# Patient Record
Sex: FEMALE | Race: WHITE | NOT HISPANIC OR LATINO | URBAN - METROPOLITAN AREA
[De-identification: names, ages, dates, MRNs, and addresses within clinical notes are randomized per-mention and may not be internally consistent; named-entity substitution may affect disease eponyms.]

---

## 2021-07-30 ENCOUNTER — OFFICE VISIT (OUTPATIENT)
Dept: URBAN - METROPOLITAN AREA CLINIC 72 | Facility: CLINIC | Age: 61
End: 2021-07-30
Payer: COMMERCIAL

## 2021-07-30 ENCOUNTER — WEB ENCOUNTER (OUTPATIENT)
Dept: URBAN - METROPOLITAN AREA CLINIC 72 | Facility: CLINIC | Age: 61
End: 2021-07-30

## 2021-07-30 VITALS — TEMPERATURE: 97.4 F | WEIGHT: 204 LBS | BODY MASS INDEX: 37.54 KG/M2 | HEIGHT: 62 IN

## 2021-07-30 DIAGNOSIS — E66.9 OBESITY (BMI 30.0-34.9): ICD-10-CM

## 2021-07-30 DIAGNOSIS — K58.2 IRRITABLE BOWEL SYNDROME WITH BOTH CONSTIPATION AND DIARRHEA: ICD-10-CM

## 2021-07-30 DIAGNOSIS — K21.9 GASTROESOPHAGEAL REFLUX DISEASE, UNSPECIFIED WHETHER ESOPHAGITIS PRESENT: ICD-10-CM

## 2021-07-30 PROCEDURE — 99204 OFFICE O/P NEW MOD 45 MIN: CPT | Performed by: INTERNAL MEDICINE

## 2021-07-30 RX ORDER — INSULIN GLARGINE 100 [IU]/ML
AS DIRECTED INJECTION, SOLUTION SUBCUTANEOUS
Status: ACTIVE | COMMUNITY

## 2021-07-30 RX ORDER — UBIDECARENONE 200 MG
AS DIRECTED CAPSULE ORAL
Status: ACTIVE | COMMUNITY

## 2021-07-30 RX ORDER — VIT C/ZINC GLUCONAT/ELDERBERRY 60 MG-5 MG
AS DIRECTED LOZENGE ORAL
Status: ACTIVE | COMMUNITY

## 2021-07-30 NOTE — HPI-TODAY'S VISIT:
Mrs. Burt is a pleasant 61-year-old female who presented for new patient for consultation for irritable bowel syndrome.  She was referred by Dr. Bren Pérez.  Her past medical history is notable for hyperlipidemia and type 2 diabetes.   she has been followed by another local gastroenterologist.  She did not feel like she has had much improvement of her IBS.  Her typical IBS pattern centers around diarrhea and constipation.  The most bothersome symptom for her is pain prior to defecation.  She notes that she will eat crampy lower abdominal pain feel the urge to go to the bathroom and then have to defecate 3-4 times starting on his normal stool and progressing to diarrhea.  She is intolerant of medications in the past for this.  She is never been on any type of antidepressant.  She also notes that she has GERD, takes omeprazole which does alleviate her symptoms.    She does not follow any specific diet, she endorses minimal exercise. She reports that she has had EGDs and colonoscopy in the recent past and were unremarkable.

## 2021-09-17 ENCOUNTER — OFFICE VISIT (OUTPATIENT)
Dept: URBAN - METROPOLITAN AREA CLINIC 72 | Facility: CLINIC | Age: 61
End: 2021-09-17

## 2021-10-08 ENCOUNTER — OFFICE VISIT (OUTPATIENT)
Dept: URBAN - METROPOLITAN AREA CLINIC 72 | Facility: CLINIC | Age: 61
End: 2021-10-08

## 2021-12-02 ENCOUNTER — OFFICE VISIT (OUTPATIENT)
Dept: URBAN - METROPOLITAN AREA CLINIC 72 | Facility: CLINIC | Age: 61
End: 2021-12-02
Payer: COMMERCIAL

## 2021-12-02 ENCOUNTER — WEB ENCOUNTER (OUTPATIENT)
Dept: URBAN - METROPOLITAN AREA CLINIC 72 | Facility: CLINIC | Age: 61
End: 2021-12-02

## 2021-12-02 VITALS
TEMPERATURE: 98.2 F | RESPIRATION RATE: 18 BRPM | SYSTOLIC BLOOD PRESSURE: 128 MMHG | HEART RATE: 69 BPM | HEIGHT: 62 IN | DIASTOLIC BLOOD PRESSURE: 83 MMHG | WEIGHT: 194 LBS

## 2021-12-02 DIAGNOSIS — K58.2 IRRITABLE BOWEL SYNDROME WITH BOTH CONSTIPATION AND DIARRHEA: ICD-10-CM

## 2021-12-02 DIAGNOSIS — K21.9 GASTROESOPHAGEAL REFLUX DISEASE, UNSPECIFIED WHETHER ESOPHAGITIS PRESENT: ICD-10-CM

## 2021-12-02 DIAGNOSIS — E66.9 OBESITY (BMI 30.0-34.9): ICD-10-CM

## 2021-12-02 PROBLEM — 443371000124107: Status: ACTIVE | Noted: 2021-07-30

## 2021-12-02 PROBLEM — 10743008: Status: ACTIVE | Noted: 2021-07-30

## 2021-12-02 PROCEDURE — 99214 OFFICE O/P EST MOD 30 MIN: CPT | Performed by: INTERNAL MEDICINE

## 2021-12-02 RX ORDER — EVOLOCUMAB 140 MG/ML
AS DIRECTED INJECTION, SOLUTION SUBCUTANEOUS EVERY 2 WEEKS
Status: ACTIVE | COMMUNITY

## 2021-12-02 RX ORDER — VIT C/ZINC GLUCONAT/ELDERBERRY 60 MG-5 MG
AS DIRECTED LOZENGE ORAL
Status: ACTIVE | COMMUNITY

## 2021-12-02 RX ORDER — INSULIN GLARGINE 100 [IU]/ML
AS DIRECTED INJECTION, SOLUTION SUBCUTANEOUS
Status: ACTIVE | COMMUNITY

## 2021-12-02 RX ORDER — UBIDECARENONE 200 MG
AS DIRECTED CAPSULE ORAL
Status: ACTIVE | COMMUNITY

## 2021-12-02 NOTE — HPI-TODAY'S VISIT:
Mrs. Burt returns for follow-up.  She is a 61-year-old female last seen in our office on 7/30/2021.  She has a history of hyperlipidemia and diabetes as well as IBS with both constipation and diarrhea.  Her typical symptoms include crampy lower abdominal discomfort with urgency.  She has been intolerant of medications for treatment for this.  Had an EGD and colonoscopy in the recent past which she described as unremarkable.  She also notes she has GERD and takes omeprazole.  We advised extensive lifestyle modification including weight loss and following the low FODMAPs diet. She feels like her symptoms are very slowly getting better, she is trying to get back on her healthy diet which does exacerbate some of her symptoms overall.

## 2023-05-31 ENCOUNTER — OFFICE VISIT (OUTPATIENT)
Dept: URBAN - METROPOLITAN AREA CLINIC 72 | Facility: CLINIC | Age: 63
End: 2023-05-31
Payer: COMMERCIAL

## 2023-05-31 VITALS
BODY MASS INDEX: 37.36 KG/M2 | WEIGHT: 203 LBS | DIASTOLIC BLOOD PRESSURE: 76 MMHG | TEMPERATURE: 97.6 F | HEIGHT: 62 IN | SYSTOLIC BLOOD PRESSURE: 127 MMHG | HEART RATE: 78 BPM

## 2023-05-31 DIAGNOSIS — K58.2 IRRITABLE BOWEL SYNDROME WITH BOTH CONSTIPATION AND DIARRHEA: ICD-10-CM

## 2023-05-31 DIAGNOSIS — E66.9 OBESITY (BMI 30.0-34.9): ICD-10-CM

## 2023-05-31 DIAGNOSIS — K21.9 GASTROESOPHAGEAL REFLUX DISEASE, UNSPECIFIED WHETHER ESOPHAGITIS PRESENT: ICD-10-CM

## 2023-05-31 PROCEDURE — 99214 OFFICE O/P EST MOD 30 MIN: CPT | Performed by: INTERNAL MEDICINE

## 2023-05-31 RX ORDER — INSULIN GLARGINE 100 [IU]/ML
AS DIRECTED INJECTION, SOLUTION SUBCUTANEOUS
Status: ACTIVE | COMMUNITY

## 2023-05-31 RX ORDER — TIRZEPATIDE 2.5 MG/.5ML
INJECT THE CONTENTS OF ONE PEN UNDER THE SKIN WEEKLY ON THE SAME DAY EACH WEEK INJECTION, SOLUTION SUBCUTANEOUS
Qty: 2 UNSPECIFIED | Refills: 0 | Status: ACTIVE | COMMUNITY

## 2023-05-31 RX ORDER — OMEPRAZOLE 40 MG/1
1 CAPSULE 30 MINUTES BEFORE MORNING MEAL CAPSULE, DELAYED RELEASE ORAL ONCE A DAY
Qty: 30 | OUTPATIENT
Start: 2023-05-31

## 2023-05-31 RX ORDER — VIT C/ZINC GLUCONAT/ELDERBERRY 60 MG-5 MG
AS DIRECTED LOZENGE ORAL
Status: ACTIVE | COMMUNITY

## 2023-05-31 RX ORDER — UBIDECARENONE 200 MG
AS DIRECTED CAPSULE ORAL
Status: ACTIVE | COMMUNITY

## 2023-05-31 RX ORDER — EVOLOCUMAB 140 MG/ML
AS DIRECTED INJECTION, SOLUTION SUBCUTANEOUS EVERY 2 WEEKS
Status: ACTIVE | COMMUNITY

## 2023-05-31 NOTE — HPI-TODAY'S VISIT:
Mrs. Burt is a 63-year-old female last seen our office on 12/2/2021.  Recall she has a history of diabetes, hyperlipidemia and alternating IBS.  Typically cold symptoms including crampy lower abdominal discomfort and urgency.  Bidirectional endoscopy in 2019 unremarkable, with extensive biopsies performed showed reflux-like changes in the esophagus, random colon biopsies and terminal ileal biopsies normal.  She has issues with reflux and takes omeprazole aggressive lifestyle modifications and low FODMAPs diet were recommended at her last visit encouraged healthy lifestyle.  She reports that since we last saw her she has been having lots of issues with reflux, IBS and alternating bowel pattern.  She is having a lot of pain in regards to her IBS.  In addition she feels protrusions in her abdomen and bloating.  She notes that her GERD has been under poor control admittedly not following any specific diet plan.  She placed herself back on omeprazole 45 days ago but has not felt much improvement.  She has worse symptoms at night.  Her bowels can alternate from constipation which is her normal to occasional explosive diarrhea.  She overall is concerned about her symptoms.  In addition she reports that she has had recurrent E. coli urinary tract infections and has a pending CT scan of the abdomen pelvis.

## 2023-06-21 ENCOUNTER — TELEPHONE ENCOUNTER (OUTPATIENT)
Dept: URBAN - METROPOLITAN AREA CLINIC 13 | Facility: CLINIC | Age: 63
End: 2023-06-21

## 2023-06-26 ENCOUNTER — ERX REFILL RESPONSE (OUTPATIENT)
Dept: URBAN - METROPOLITAN AREA CLINIC 72 | Facility: CLINIC | Age: 63
End: 2023-06-26

## 2023-06-26 RX ORDER — OMEPRAZOLE 40 MG/1
1 CAPSULE 30 MINUTES BEFORE MORNING MEAL CAPSULE, DELAYED RELEASE ORAL ONCE A DAY
Qty: 30 | OUTPATIENT

## 2023-06-26 RX ORDER — OMEPRAZOLE 40 MG/1
TAKE ONE CAPSULE BY MOUTH 30 MINUTES BEFORE MORNING MEAL ONE TIME DAILY CAPSULE, DELAYED RELEASE ORAL
Qty: 30 CAPSULE | Refills: 0 | OUTPATIENT

## 2023-07-11 ENCOUNTER — OFFICE VISIT (OUTPATIENT)
Dept: URBAN - METROPOLITAN AREA CLINIC 72 | Facility: CLINIC | Age: 63
End: 2023-07-11
Payer: COMMERCIAL

## 2023-07-11 VITALS
RESPIRATION RATE: 18 BRPM | BODY MASS INDEX: 36.07 KG/M2 | HEART RATE: 74 BPM | SYSTOLIC BLOOD PRESSURE: 128 MMHG | WEIGHT: 196 LBS | HEIGHT: 62 IN | DIASTOLIC BLOOD PRESSURE: 66 MMHG | TEMPERATURE: 97.7 F

## 2023-07-11 DIAGNOSIS — K21.9 CHRONIC GERD: ICD-10-CM

## 2023-07-11 DIAGNOSIS — K58.2 IRRITABLE BOWEL SYNDROME WITH BOTH CONSTIPATION AND DIARRHEA: ICD-10-CM

## 2023-07-11 PROCEDURE — 99213 OFFICE O/P EST LOW 20 MIN: CPT | Performed by: INTERNAL MEDICINE

## 2023-07-11 RX ORDER — OMEPRAZOLE 40 MG/1
TAKE ONE CAPSULE BY MOUTH 30 MINUTES BEFORE MORNING MEAL ONE TIME DAILY CAPSULE, DELAYED RELEASE ORAL
Qty: 30 CAPSULE | Refills: 0 | Status: ACTIVE | COMMUNITY

## 2023-07-11 RX ORDER — VIT C/ZINC GLUCONAT/ELDERBERRY 60 MG-5 MG
AS DIRECTED LOZENGE ORAL
Status: ON HOLD | COMMUNITY

## 2023-07-11 RX ORDER — INSULIN GLARGINE 100 [IU]/ML
AS DIRECTED INJECTION, SOLUTION SUBCUTANEOUS
Status: ACTIVE | COMMUNITY

## 2023-07-11 RX ORDER — UBIDECARENONE 200 MG
AS DIRECTED CAPSULE ORAL
Status: ON HOLD | COMMUNITY

## 2023-07-11 RX ORDER — TIRZEPATIDE 2.5 MG/.5ML
INJECT THE CONTENTS OF ONE PEN UNDER THE SKIN WEEKLY ON THE SAME DAY EACH WEEK INJECTION, SOLUTION SUBCUTANEOUS
Qty: 2 UNSPECIFIED | Refills: 0 | Status: ACTIVE | COMMUNITY

## 2023-07-11 RX ORDER — EVOLOCUMAB 140 MG/ML
AS DIRECTED INJECTION, SOLUTION SUBCUTANEOUS EVERY 2 WEEKS
Status: ACTIVE | COMMUNITY

## 2023-07-11 NOTE — HPI-TODAY'S VISIT:
Mrs. Burt returns for follow-up, last seen in office on 5/31/2023.  Recall she is a 63-year-old with history of diabetes, hyperlipidemia and IBS with alternating bowel pattern typically including symptoms of crampy lower abdominal discomfort and urgency.  Had bidirectional endoscopy 2019 which was unremarkable including extensive biopsies.  There were reflux-like changes in the esophagus but random colon biopsies and terminal ileal biopsies were all normal.  She has used omeprazole for reflux control in the past.  Aggressive lifestyle modifications and low FODMAPs diet were also encouraged.  When we last saw her she is having lots of issues with her reflux and IBS.  She had weaned off her PPI but placed her self back on it due to worsening symptoms.  She unfortunately had developed a recurrent E. coli urinary tract infection and we last saw her had a pending CT scan ordered by urology.  The CT scan done 6/9/2023 of the abdomen pelvis without contrast done for UTI pain showed a tiny hiatal hernia normal liver absent gallbladder, normal pancreas otherwise unremarkable.  She is having a hard time with nausea dyspepsia and constipation.  She contributes this to her Mounjaro, was supposed to titrate up from 2.5 now currently on 5 mL having lots of side effects related to it.  Tried to do Benefiber while on the Mounjaro felt extremely bloated and thus discontinued the Benefiber.  She has follow-up with her endocrinologist today, she is not interested in making any changes of her medications for the time being because she wants to see if the Mounjaro is causing her symptoms to worsen.

## 2023-08-02 ENCOUNTER — ERX REFILL RESPONSE (OUTPATIENT)
Dept: URBAN - METROPOLITAN AREA CLINIC 72 | Facility: CLINIC | Age: 63
End: 2023-08-02

## 2023-08-02 RX ORDER — OMEPRAZOLE 40 MG/1
TAKE ONE CAPSULE BY MOUTH 30 MINUTES BEFORE MORNING MEAL ONE TIME DAILY CAPSULE, DELAYED RELEASE ORAL
Qty: 30 CAPSULE | Refills: 0 | OUTPATIENT

## 2023-08-02 RX ORDER — OMEPRAZOLE 40 MG/1
TAKE ONE CAPSULE BY MOUTH ONE TIME DAILY 30 MINUTES BEFORE MORNING MEAL CAPSULE, DELAYED RELEASE ORAL
Qty: 30 CAPSULE | Refills: 0 | OUTPATIENT

## 2023-08-16 ENCOUNTER — OFFICE VISIT (OUTPATIENT)
Dept: URBAN - METROPOLITAN AREA CLINIC 72 | Facility: CLINIC | Age: 63
End: 2023-08-16

## 2023-08-29 ENCOUNTER — ERX REFILL RESPONSE (OUTPATIENT)
Dept: URBAN - METROPOLITAN AREA CLINIC 72 | Facility: CLINIC | Age: 63
End: 2023-08-29

## 2023-08-29 RX ORDER — OMEPRAZOLE 40 MG/1
TAKE ONE CAPSULE BY MOUTH EVERY MORNING 30 MINUTES BEFORE MORNING MEAL CAPSULE, DELAYED RELEASE ORAL
Qty: 30 CAPSULE | Refills: 5 | OUTPATIENT

## 2023-08-29 RX ORDER — OMEPRAZOLE 40 MG/1
TAKE ONE CAPSULE BY MOUTH ONE TIME DAILY 30 MINUTES BEFORE MORNING MEAL CAPSULE, DELAYED RELEASE ORAL
Qty: 30 CAPSULE | Refills: 0 | OUTPATIENT

## 2023-10-04 ENCOUNTER — OFFICE VISIT (OUTPATIENT)
Dept: URBAN - METROPOLITAN AREA CLINIC 72 | Facility: CLINIC | Age: 63
End: 2023-10-04
Payer: COMMERCIAL

## 2023-10-04 ENCOUNTER — WEB ENCOUNTER (OUTPATIENT)
Dept: URBAN - METROPOLITAN AREA CLINIC 72 | Facility: CLINIC | Age: 63
End: 2023-10-04

## 2023-10-04 ENCOUNTER — DASHBOARD ENCOUNTERS (OUTPATIENT)
Age: 63
End: 2023-10-04

## 2023-10-04 VITALS
HEIGHT: 62 IN | SYSTOLIC BLOOD PRESSURE: 124 MMHG | DIASTOLIC BLOOD PRESSURE: 62 MMHG | TEMPERATURE: 96.9 F | WEIGHT: 198.6 LBS | BODY MASS INDEX: 36.55 KG/M2 | HEART RATE: 71 BPM

## 2023-10-04 DIAGNOSIS — K58.2 IRRITABLE BOWEL SYNDROME WITH BOTH CONSTIPATION AND DIARRHEA: ICD-10-CM

## 2023-10-04 DIAGNOSIS — K21.9 CHRONIC GERD: ICD-10-CM

## 2023-10-04 PROCEDURE — 99213 OFFICE O/P EST LOW 20 MIN: CPT | Performed by: INTERNAL MEDICINE

## 2023-10-04 RX ORDER — VIT C/ZINC GLUCONAT/ELDERBERRY 60 MG-5 MG
AS DIRECTED LOZENGE ORAL
Status: ON HOLD | COMMUNITY

## 2023-10-04 RX ORDER — TIRZEPATIDE 2.5 MG/.5ML
INJECT THE CONTENTS OF ONE PEN UNDER THE SKIN WEEKLY ON THE SAME DAY EACH WEEK INJECTION, SOLUTION SUBCUTANEOUS
Qty: 2 UNSPECIFIED | Refills: 0 | Status: ON HOLD | COMMUNITY

## 2023-10-04 RX ORDER — OMEPRAZOLE 40 MG/1
TAKE ONE CAPSULE BY MOUTH EVERY MORNING 30 MINUTES BEFORE MORNING MEAL CAPSULE, DELAYED RELEASE ORAL
Qty: 30 CAPSULE | Refills: 5 | Status: ACTIVE | COMMUNITY

## 2023-10-04 RX ORDER — UBIDECARENONE 200 MG
AS DIRECTED CAPSULE ORAL
Status: ON HOLD | COMMUNITY

## 2023-10-04 RX ORDER — INSULIN GLARGINE 100 [IU]/ML
AS DIRECTED INJECTION, SOLUTION SUBCUTANEOUS
Status: ON HOLD | COMMUNITY

## 2023-10-04 RX ORDER — EVOLOCUMAB 140 MG/ML
AS DIRECTED INJECTION, SOLUTION SUBCUTANEOUS EVERY 2 WEEKS
Status: ACTIVE | COMMUNITY

## 2023-10-04 NOTE — EXAM-GENERAL EXAMINATION
General--no acute distress, resting comfortably Eyes--anicteric, no pallor HENT--normocephalic, atraumatic head Neck--no lymphadenopathy, non tender Chest--non labored breathing, equal rise Abdomen--soft, non tender, non distended, no organomegaly Ext: NILE, no obvious sores or rashes Psych: appropriate mood and affect Neuro--alert and oriented, answers appropriately

## 2023-10-04 NOTE — HPI-TODAY'S VISIT:
Mrs. Burt returns for follow-up.  Recall she is a 63-year-old female last seen our office on 7/11/2023.  Past medical history includes diabetes hyperlipidemia and IBS.  We been following her for irritable bowel syndrome with alternating bowel pattern.  Bidirectional endoscopy from 2019 showed reflux changes of the esophagus with normal terminal ileum and random colon biopsies.  Low FODMAPs diet has been encouraged.  She uses omeprazole for reflux control.  Due to recurrent urinary tract infection she had a CT scan ordered by urology done 6/9/2023 without contrast showed tiny hiatal hernia absent gallbladder normal pancreas otherwise unremarkable.  She was started on Mounjaro and noticed that her reflux symptoms seem to worsen as did her IBS symptoms.  We recommended ongoing lifestyle modifications, continuing omeprazole for the time being and considering holding off on the Mounjaro to see if the symptoms were related to the medication.  She is off Mounjaro and doing much better.  She started Farxiga.  Is tolerating well no longer having issues with diarrhea or constipation really.  She has implemented some dietary changes which seem to help her symptoms and allow her to have daily bowel movements.  She feels much better today.

## 2024-05-14 ENCOUNTER — ERX REFILL RESPONSE (OUTPATIENT)
Dept: URBAN - METROPOLITAN AREA CLINIC 72 | Facility: CLINIC | Age: 64
End: 2024-05-14

## 2024-05-14 ENCOUNTER — TELEPHONE ENCOUNTER (OUTPATIENT)
Dept: URBAN - METROPOLITAN AREA CLINIC 72 | Facility: CLINIC | Age: 64
End: 2024-05-14

## 2024-05-14 RX ORDER — OMEPRAZOLE 40 MG/1
TAKE ONE CAPSULE BY MOUTH EVERY MORNING 30 MINUTES BEFORE MORNING MEAL CAPSULE, DELAYED RELEASE ORAL ONCE A DAY
Qty: 30 | Refills: 5 | OUTPATIENT

## 2024-05-14 RX ORDER — OMEPRAZOLE 40 MG/1
TAKE ONE CAPSULE BY MOUTH EVERY MORNING 30 MINUTES BEFORE MORNING MEAL CAPSULE, DELAYED RELEASE ORAL ONCE A DAY
Qty: 30 | Refills: 5

## 2024-05-14 RX ORDER — OMEPRAZOLE 40 MG/1
TAKE ONE CAPSULE BY MOUTH EVERY MORNING 30 MINUTES BEFORE MORNING MEAL CAPSULE, DELAYED RELEASE ORAL
Qty: 30 CAPSULE | Refills: 5 | OUTPATIENT

## 2024-08-28 ENCOUNTER — OFFICE VISIT (OUTPATIENT)
Dept: URBAN - METROPOLITAN AREA CLINIC 72 | Facility: CLINIC | Age: 64
End: 2024-08-28
Payer: COMMERCIAL

## 2024-08-28 ENCOUNTER — LAB OUTSIDE AN ENCOUNTER (OUTPATIENT)
Dept: URBAN - METROPOLITAN AREA CLINIC 72 | Facility: CLINIC | Age: 64
End: 2024-08-28

## 2024-08-28 VITALS
SYSTOLIC BLOOD PRESSURE: 117 MMHG | TEMPERATURE: 97.2 F | HEART RATE: 75 BPM | HEIGHT: 62 IN | BODY MASS INDEX: 36.33 KG/M2 | WEIGHT: 197.4 LBS | DIASTOLIC BLOOD PRESSURE: 63 MMHG

## 2024-08-28 DIAGNOSIS — R11.0 NAUSEA: ICD-10-CM

## 2024-08-28 DIAGNOSIS — K62.5 BRBPR (BRIGHT RED BLOOD PER RECTUM): ICD-10-CM

## 2024-08-28 DIAGNOSIS — K58.2 IRRITABLE BOWEL SYNDROME WITH BOTH CONSTIPATION AND DIARRHEA: ICD-10-CM

## 2024-08-28 DIAGNOSIS — R19.4 RECENT CHANGE IN FREQUENCY OF BOWEL MOVEMENTS: ICD-10-CM

## 2024-08-28 DIAGNOSIS — K21.9 CHRONIC GERD: ICD-10-CM

## 2024-08-28 PROCEDURE — 99214 OFFICE O/P EST MOD 30 MIN: CPT | Performed by: INTERNAL MEDICINE

## 2024-08-28 RX ORDER — TIRZEPATIDE 2.5 MG/.5ML
INJECT THE CONTENTS OF ONE PEN UNDER THE SKIN WEEKLY ON THE SAME DAY EACH WEEK INJECTION, SOLUTION SUBCUTANEOUS
Qty: 2 UNSPECIFIED | Refills: 0 | Status: ON HOLD | COMMUNITY

## 2024-08-28 RX ORDER — EVOLOCUMAB 140 MG/ML
AS DIRECTED INJECTION, SOLUTION SUBCUTANEOUS EVERY 2 WEEKS
Status: ACTIVE | COMMUNITY

## 2024-08-28 RX ORDER — UBIDECARENONE 200 MG
AS DIRECTED CAPSULE ORAL
Status: ON HOLD | COMMUNITY

## 2024-08-28 RX ORDER — LEVOTHYROXINE, LIOTHYRONINE 38; 9 UG/1; UG/1
1 TABLET ON AN EMPTY STOMACH TABLET ORAL ONCE A DAY
Status: ACTIVE | COMMUNITY

## 2024-08-28 RX ORDER — VIT C/ZINC GLUCONAT/ELDERBERRY 60 MG-5 MG
AS DIRECTED LOZENGE ORAL
Status: ON HOLD | COMMUNITY

## 2024-08-28 RX ORDER — DAPAGLIFLOZIN 10 MG/1
1 TABLET TABLET, FILM COATED ORAL ONCE A DAY
Status: ACTIVE | COMMUNITY

## 2024-08-28 RX ORDER — OMEPRAZOLE 40 MG/1
TAKE ONE CAPSULE BY MOUTH EVERY MORNING 30 MINUTES BEFORE MORNING MEAL CAPSULE, DELAYED RELEASE ORAL
Qty: 30 CAPSULE | Refills: 5 | Status: ACTIVE | COMMUNITY

## 2024-08-28 RX ORDER — INSULIN GLARGINE 100 [IU]/ML
AS DIRECTED INJECTION, SOLUTION SUBCUTANEOUS
Status: ON HOLD | COMMUNITY

## 2024-08-28 NOTE — EXAM-PHYSICAL EXAM
General--no acute distress, resting comfortably Eyes--anicteric, no pallor HENT--normocephalic, atraumatic head Neck--no lymphadenopathy, symmetric Chest--non labored breathing, equal rise Abdomen--soft, non tender, non distended, no organomegaly Ext: NILE, no obvious sores or rashes

## 2024-08-28 NOTE — HPI-TODAY'S VISIT:
Mrs. Burt returns for follow-up.  Recall she is a 64-year-old last seen in office on 10/4/2023.  She has a history of IBS with altered bowel pattern, hiatal hernia with reflux, diabetes, hyperlipidemia.  Bidirectional endoscopy in 2019 had reflux changes esophagus and normal colon.  She has been on PPI for management of her reflux, at last office visit she was started on Mounjaro which did seem to worsen her reflux and IBS.  Lifestyle modifications were encouraged.  She ultimately transition from Mounjaro to Farxiga with better symptom management. She reports that she has developed symptoms of nausea diarrhea and rectal bleeding worsening over the last 1 to 2 months.  She went off of her diabetic medications and her A1c shot up she was recently restarted on Farxiga but an increase from 5 mg daily to 10 mg daily.  She gets diarrhea and crampy lower abdominal pain with food almost immediately after she eats, no nocturnal stools.  She typically has constipation but for the last 1 to 2 months this has looser stools and now for diarrhea.

## 2024-09-23 ENCOUNTER — OFFICE VISIT (OUTPATIENT)
Dept: URBAN - METROPOLITAN AREA MEDICAL CENTER 40 | Facility: MEDICAL CENTER | Age: 64
End: 2024-09-23

## 2024-10-08 ENCOUNTER — OFFICE VISIT (OUTPATIENT)
Dept: URBAN - METROPOLITAN AREA CLINIC 72 | Facility: CLINIC | Age: 64
End: 2024-10-08

## 2024-12-02 ENCOUNTER — ERX REFILL RESPONSE (OUTPATIENT)
Dept: URBAN - METROPOLITAN AREA CLINIC 72 | Facility: CLINIC | Age: 64
End: 2024-12-02

## 2024-12-02 RX ORDER — OMEPRAZOLE 40 MG/1
TAKE ONE CAPSULE BY MOUTH EVERY MORNING 30 MINUTES BEFORE MORNING MEAL CAPSULE, DELAYED RELEASE ORAL
Qty: 30 CAPSULE | Refills: 5 | OUTPATIENT

## 2025-06-03 ENCOUNTER — ERX REFILL RESPONSE (OUTPATIENT)
Dept: URBAN - METROPOLITAN AREA CLINIC 72 | Facility: CLINIC | Age: 65
End: 2025-06-03

## 2025-06-03 RX ORDER — OMEPRAZOLE 40 MG/1
TAKE ONE CAPSULE BY MOUTH EVERY MORNING 30 MINUTES BEFORE MORNING MEAL CAPSULE, DELAYED RELEASE ORAL
Qty: 30 CAPSULE | Refills: 2

## 2025-08-26 ENCOUNTER — OFFICE VISIT (OUTPATIENT)
Dept: URBAN - METROPOLITAN AREA CLINIC 72 | Facility: CLINIC | Age: 65
End: 2025-08-26
Payer: COMMERCIAL

## 2025-08-26 DIAGNOSIS — K21.9 GASTROESOPHAGEAL REFLUX DISEASE, UNSPECIFIED WHETHER ESOPHAGITIS PRESENT: ICD-10-CM

## 2025-08-26 DIAGNOSIS — K58.2 IRRITABLE BOWEL SYNDROME WITH BOTH CONSTIPATION AND DIARRHEA: ICD-10-CM

## 2025-08-26 DIAGNOSIS — K44.9 DIAPHRAGMATIC HERNIA WITHOUT OBSTRUCTION OR GANGRENE: ICD-10-CM

## 2025-08-26 PROCEDURE — 99214 OFFICE O/P EST MOD 30 MIN: CPT | Performed by: INTERNAL MEDICINE

## 2025-08-26 RX ORDER — DAPAGLIFLOZIN 10 MG/1
1 TABLET TABLET, FILM COATED ORAL ONCE A DAY
Status: ACTIVE | COMMUNITY

## 2025-08-26 RX ORDER — EMPAGLIFLOZIN 10 MG/1
1 TABLET TABLET, FILM COATED ORAL ONCE A DAY
Status: ACTIVE | COMMUNITY

## 2025-08-26 RX ORDER — VIT C/ZINC GLUCONAT/ELDERBERRY 60 MG-5 MG
AS DIRECTED LOZENGE ORAL
Status: ON HOLD | COMMUNITY

## 2025-08-26 RX ORDER — INSULIN GLARGINE 100 [IU]/ML
AS DIRECTED INJECTION, SOLUTION SUBCUTANEOUS
Status: ON HOLD | COMMUNITY

## 2025-08-26 RX ORDER — UBIDECARENONE 200 MG
AS DIRECTED CAPSULE ORAL
Status: ON HOLD | COMMUNITY

## 2025-08-26 RX ORDER — OMEPRAZOLE 40 MG/1
TAKE ONE CAPSULE BY MOUTH EVERY MORNING 30 MINUTES BEFORE MORNING MEAL CAPSULE, DELAYED RELEASE ORAL
Qty: 30 CAPSULE | Refills: 2 | Status: ACTIVE | COMMUNITY

## 2025-08-26 RX ORDER — LEVOTHYROXINE, LIOTHYRONINE 38; 9 UG/1; UG/1
1 TABLET ON AN EMPTY STOMACH TABLET ORAL ONCE A DAY
Status: ACTIVE | COMMUNITY

## 2025-08-26 RX ORDER — EVOLOCUMAB 140 MG/ML
AS DIRECTED INJECTION, SOLUTION SUBCUTANEOUS EVERY 2 WEEKS
Status: ACTIVE | COMMUNITY

## 2025-08-26 RX ORDER — TIRZEPATIDE 2.5 MG/.5ML
INJECT THE CONTENTS OF ONE PEN UNDER THE SKIN WEEKLY ON THE SAME DAY EACH WEEK INJECTION, SOLUTION SUBCUTANEOUS
Qty: 2 UNSPECIFIED | Refills: 0 | Status: ON HOLD | COMMUNITY